# Patient Record
(demographics unavailable — no encounter records)

---

## 2024-11-05 NOTE — HISTORY OF PRESENT ILLNESS
[FreeTextEntry1] : Establish g i care since early teen stomach issue pain and bloat after eating told of lpr by ent gas issue bowel normal with some urgency did not do well on omeprazole tryied lactaid with no help no coffee

## 2024-11-05 NOTE — ASSESSMENT
[FreeTextEntry1] : gerd/lpt probable ibs  Extensive blood eval sed, crp celiac and anca try famotidine 40 bid pro biotic flolastor 2 bid   consider other med in future consider colon/egd eventual call in 4 week

## 2025-03-31 NOTE — PLAN
[FreeTextEntry1] : Stomach issues discussed prep day before procedure discussed Diet day of prep discussed both procedures discussed and questions answered labs reviewed and utd

## 2025-03-31 NOTE — HISTORY OF PRESENT ILLNESS
[Home] : at home, [unfilled] , at the time of the visit. [Medical Office: ([facility])___] : at the medical office located in  [Telehealth (audio & video)] : This visit was provided via telehealth using real-time 2-way audio visual technology. [FreeTextEntry1] : pre procedure [de-identified] : conitnued abd bloat/pain nausea/gas changer of bm failed aciphex/metronidazole

## 2025-06-25 NOTE — PHYSICAL EXAM
[Well Developed] : well developed [Well Nourished] : well nourished [Normal Conjunctiva] : normal conjunctiva [Normal Venous Pressure] : normal venous pressure [No Carotid Bruit] : no carotid bruit [Normal S1, S2] : normal S1, S2 [No Murmur] : no murmur [Clear Lung Fields] : clear lung fields [Soft] : abdomen soft [Non Tender] : non-tender [Normal Gait] : normal gait [No Edema] : no edema [Normal DP B/L] : normal DP B/L [de-identified] : Rhythm is regular no premature beats noted [de-identified] : Chest wall pectus excavatum

## 2025-06-25 NOTE — DISCUSSION/SUMMARY
[FreeTextEntry1] : 1.  I reassured him that I did not think there was any significant cardiac issues and these was probably either benign PVCs or APCs 2.  I see no cardiac contraindication to him taking nortriptyline 3.  To better document what is going on, I placed a 15-day monitor to actually document what the arrhythmia is

## 2025-06-25 NOTE — HISTORY OF PRESENT ILLNESS
[FreeTextEntry1] : Bashir is an overall healthy 33-year-old.  He has a history of pectus excavatum but asymptomatic with no cardiac issues. He is being evaluated for abdominal pain of unclear etiology.  He has had an extensive GI workup without a clear etiology.  He is soon to be scheduled  for ultrasound of the abdomen.  He had occasional palpitations in the past stop drinking caffeine and these palpitations did get somewhat better.  The palpitations are described as a extra heartbeat followed by a pause which is quite uncomfortable for him it can occur a few times a week and then not occur for 2 weeks.  There is no associated shortness of breath dizziness or syncope  He has no history of diabetes hypertension is a non-smoker The etiology of his abdominal pain is unclear and he has been tried on a few medications and now was recommended  Nortriptyline 25 mg at bedtime  EKG June 25, 2025 normal sinus rhythm within normal limits normal RI QRS and QT interval

## 2025-06-25 NOTE — REASON FOR VISIT
[FreeTextEntry1] : Bashir Cappsyesenia 33-year-old here for evaluation of his cardiac status because of symptoms of intermittent palpitations.  He was seen on June 25, 2025

## 2025-06-25 NOTE — ASSESSMENT
[FreeTextEntry1] : Bashir Is a healthy 33-year-old with abdominal discomfort of unclear etiology.  He has been experiencing some palpitations which are described as premature beats which are likely either APCs or PVCs with a pause. He has a normal physical  Exam except for pectus excavatum normal blood pressure no murmurs and a normal EKG without any arrhythmia I believe the etiology of the palpitations is probably benign APCs or PVCs  He becomes symptomatic however with these since palpitations